# Patient Record
Sex: FEMALE | Race: WHITE
[De-identification: names, ages, dates, MRNs, and addresses within clinical notes are randomized per-mention and may not be internally consistent; named-entity substitution may affect disease eponyms.]

---

## 2018-07-14 ENCOUNTER — HOSPITAL ENCOUNTER (EMERGENCY)
Dept: HOSPITAL 47 - EC | Age: 63
Discharge: HOME | End: 2018-07-14
Payer: COMMERCIAL

## 2018-07-14 VITALS — DIASTOLIC BLOOD PRESSURE: 61 MMHG | TEMPERATURE: 98.3 F | HEART RATE: 73 BPM | SYSTOLIC BLOOD PRESSURE: 137 MMHG

## 2018-07-14 VITALS — RESPIRATION RATE: 18 BRPM

## 2018-07-14 DIAGNOSIS — Z79.899: ICD-10-CM

## 2018-07-14 DIAGNOSIS — M06.9: ICD-10-CM

## 2018-07-14 DIAGNOSIS — Z87.891: ICD-10-CM

## 2018-07-14 DIAGNOSIS — M25.561: ICD-10-CM
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
## 2018-07-14 DIAGNOSIS — M25.461: ICD-10-CM

## 2018-07-14 DIAGNOSIS — S89.91XA: Primary | ICD-10-CM
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
## 2018-07-14 PROCEDURE — 99283 EMERGENCY DEPT VISIT LOW MDM: CPT

## 2018-07-14 PROCEDURE — 73562 X-RAY EXAM OF KNEE 3: CPT

## 2018-07-14 NOTE — ED
General Adult HPI





- General


Chief complaint: Extremity Injury, Lower


Stated complaint: Knee injury/pain


Time Seen by Provider: 07/14/18 10:58


Source: patient, RN notes reviewed


Mode of arrival: wheelchair


Limitations: physical limitation





- History of Present Illness


Initial comments: 





Patient 63-year-old female presented to the emergency room today with a chief 

complaint of an injury to the right knee that occurred yesterday.  Patient does 

admit that she was trying to step over her dog and fell down onto the anterior 

aspect of the right knee.  There is no head injury or loss conscious.  She 

states it happened about 9 PM last night.  States she did ice it before going 

to bed.  Woke up this morning increased swelling.  States she is able to bear 

weight but with ambulation does have increased pain.  Patient denies any other 

complaints. Patient denies any recent fever, chills, shortness of breath, chest 

pain, back pain, abdominal pain, headaches or visual changes, or any other 

complaints.





- Related Data


 Home Medications











 Medication  Instructions  Recorded  Confirmed


 


Folic Acid 1 mg PO DAILY 04/08/16 07/14/18


 


Methotrexate Sodium [Methotrexate] 7.5 mg PO Q7D 04/08/16 07/14/18


 


Milk Thistle With Vit E 2 tab PO DAILY 04/08/16 07/14/18


 


Multivit-Min/FA/Lycopen/Lutein 1 each PO DAILY 04/08/16 07/14/18





[Centrum Silver Tablet]   


 


cycloSPORINE [Restasis] 1 drop BOTH EYES BID 04/08/16 07/14/18


 


sulfaSALAzine [Azulfidine] 1,000 mg PO BID 04/08/16 07/14/18


 


Hydroxychloroquine Sulfate 400 mg PO DAILY 07/14/18 07/14/18





[Plaquenil]   








 Previous Rx's











 Medication  Instructions  Recorded


 


Ibuprofen [Motrin] 600 mg PO Q6HR PRN #40 day 07/14/18











 Allergies











Allergy/AdvReac Type Severity Reaction Status Date / Time


 


No Known Allergies Allergy   Verified 07/14/18 10:30














Review of Systems


ROS Statement: 


Those systems with pertinent positive or pertinent negative responses have been 

documented in the HPI.





ROS Other: All systems not noted in ROS Statement are negative.





Past Medical History


Past Medical History: GERD/Reflux, Osteoarthritis (OA), Rheumatoid Arthritis (RA

)


Additional Past Medical History / Comment(s): THALASSEMIA TRAIT.


History of Any Multi-Drug Resistant Organisms: None Reported


Additional Past Surgical History / Comment(s): MIRIAM CATARACTS, COLONOSCOPY, 

total right hip anterior 4/15/16.


Past Anesthesia/Blood Transfusion Reactions: No Reported Reaction


Past Psychological History: No Psychological Hx Reported


Smoking Status: Former smoker


Past Alcohol Use History: None Reported


Past Drug Use History: None Reported





- Past Family History


  ** Mother


Family Medical History: No Reported History





General Exam





- General Exam Comments


Initial Comments: 





General:  The patient is awake and alert, in no distress, and does not appear 

acutely ill.   


Neck:  The neck is supple, there is no tenderness or JVD.  


Musculoskeletal: Patient has swelling of the right knee.  Locally tender over 

the medial anterior tibia.  Patient shows limited range of motion with flexion 

able to fully extend.  Sensations intact.  Pupils equal.


Neurological:  A&O x 3. CN II-XII intact, There are no obvious motor or sensory 

deficits. Coordination appears grossly intact. Speech is normal.


Skin:  Skin is warm and dry and no rashes or lesions are noted. 


Psychiatric:  Normal mood and affect.  


Limitations: physical limitation





Course


 Vital Signs











  07/14/18





  10:28


 


Temperature 98.5 F


 


Pulse Rate 87


 


Respiratory 18





Rate 


 


Blood Pressure 144/85


 


O2 Sat by Pulse 100





Oximetry 














Medical Decision Making





- Medical Decision Making





Patient's x-ray reviewed and read by radiology with no acute fracture 

dislocation.  Patient will be placed in knee immobilizer advised follow-up with 

orthopedic doctor for further evaluation.  Advised return if any symptoms 

increase worsen.





Disposition


Clinical Impression: 


 Knee injuries





Disposition: HOME SELF-CARE


Condition: Good


Instructions:  Knee Pain (ED)


Additional Instructions: 


Please follow-up with orthopedic doctor the next 2 days.  Please use knee 

immobilizer up and moving around.  Please continue to ice elevate the affected 

area and use ibuprofen for pain.  Please return to emergency room for any other 

concerns.


Prescriptions: 


Ibuprofen [Motrin] 600 mg PO Q6HR PRN #40 day


 PRN Reason: Pain


Is patient prescribed a controlled substance at d/c from ED?: No


Referrals: 


Kristen Galvan DO [Primary Care Provider] - 1-2 days


Quang Harrison DO [Doctor of Osteopathic Medicine] - 1-2 days


Time of Disposition: 12:11

## 2018-07-14 NOTE — XR
EXAMINATION TYPE: XR knee complete RT , 3 VIEWS

 

DATE OF EXAM ORDERED: 7/14/2018

 

HISTORY: Pain.

 

COMPARISON: None.

 

FINDINGS:  No fracture, dislocation or joint effusion is seen. There may be some mild medial joint sp
ace loss.

 

IMPRESSION: 

1. NO ACUTE OSSEOUS LESION.

2. I CANNOT EXCLUDE SOME EARLY OSTEOARTHRITIC CHANGE.